# Patient Record
Sex: MALE | Race: BLACK OR AFRICAN AMERICAN | HISPANIC OR LATINO | Employment: STUDENT | URBAN - NONMETROPOLITAN AREA
[De-identification: names, ages, dates, MRNs, and addresses within clinical notes are randomized per-mention and may not be internally consistent; named-entity substitution may affect disease eponyms.]

---
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
## 2024-01-13 ENCOUNTER — HOSPITAL ENCOUNTER (EMERGENCY)
Facility: HOSPITAL | Age: 19
Discharge: HOME | End: 2024-01-13
Attending: EMERGENCY MEDICINE | Admitting: EMERGENCY MEDICINE
Payer: COMMERCIAL

## 2024-01-13 VITALS
HEART RATE: 84 BPM | SYSTOLIC BLOOD PRESSURE: 122 MMHG | TEMPERATURE: 97.9 F | OXYGEN SATURATION: 97 % | RESPIRATION RATE: 16 BRPM | DIASTOLIC BLOOD PRESSURE: 78 MMHG

## 2024-01-13 DIAGNOSIS — R36.9 ABNORMAL PENILE DISCHARGE: ICD-10-CM

## 2024-01-13 DIAGNOSIS — Z11.3 SCREENING EXAMINATION FOR STD (SEXUALLY TRANSMITTED DISEASE): Primary | ICD-10-CM

## 2024-01-13 LAB
BACTERIA UR QL AUTO: NORMAL /HPF
BILIRUB UR QL STRIP: NEGATIVE
CLARITY UR: ABNORMAL
COLOR UR: YELLOW
GLUCOSE UR STRIP-MCNC: NEGATIVE MG/DL
HGB UR QL STRIP.AUTO: NEGATIVE
KETONES UR STRIP-MCNC: NEGATIVE MG/DL
LEUKOCYTE ESTERASE UR QL STRIP: ABNORMAL
NITRITE UR QL STRIP: NEGATIVE
NON-SQ EPI CELLS URNS QL MICRO: NORMAL /HPF
PH UR STRIP.AUTO: 7.5 [PH]
PROT UR STRIP-MCNC: NEGATIVE MG/DL
RBC #/AREA URNS AUTO: NORMAL /HPF
SP GR UR STRIP.AUTO: 1.01 (ref 1–1.03)
UROBILINOGEN UR QL STRIP.AUTO: 0.2 E.U./DL
WBC #/AREA URNS AUTO: NORMAL /HPF

## 2024-01-13 PROCEDURE — 96372 THER/PROPH/DIAG INJ SC/IM: CPT

## 2024-01-13 PROCEDURE — 81001 URINALYSIS AUTO W/SCOPE: CPT | Performed by: EMERGENCY MEDICINE

## 2024-01-13 PROCEDURE — 36415 COLL VENOUS BLD VENIPUNCTURE: CPT | Performed by: EMERGENCY MEDICINE

## 2024-01-13 PROCEDURE — 86706 HEP B SURFACE ANTIBODY: CPT | Performed by: EMERGENCY MEDICINE

## 2024-01-13 PROCEDURE — 87661 TRICHOMONAS VAGINALIS AMPLIF: CPT | Performed by: EMERGENCY MEDICINE

## 2024-01-13 PROCEDURE — 86780 TREPONEMA PALLIDUM: CPT | Performed by: EMERGENCY MEDICINE

## 2024-01-13 PROCEDURE — 87340 HEPATITIS B SURFACE AG IA: CPT | Performed by: EMERGENCY MEDICINE

## 2024-01-13 PROCEDURE — 99283 EMERGENCY DEPT VISIT LOW MDM: CPT

## 2024-01-13 PROCEDURE — 87389 HIV-1 AG W/HIV-1&-2 AB AG IA: CPT | Performed by: EMERGENCY MEDICINE

## 2024-01-13 PROCEDURE — 87591 N.GONORRHOEAE DNA AMP PROB: CPT | Performed by: EMERGENCY MEDICINE

## 2024-01-13 PROCEDURE — 99284 EMERGENCY DEPT VISIT MOD MDM: CPT | Performed by: EMERGENCY MEDICINE

## 2024-01-13 PROCEDURE — 87491 CHLMYD TRACH DNA AMP PROBE: CPT | Performed by: EMERGENCY MEDICINE

## 2024-01-13 PROCEDURE — 87563 M. GENITALIUM AMP PROBE: CPT | Performed by: EMERGENCY MEDICINE

## 2024-01-13 PROCEDURE — 86803 HEPATITIS C AB TEST: CPT | Performed by: EMERGENCY MEDICINE

## 2024-01-13 RX ORDER — ONDANSETRON 4 MG/1
4 TABLET, ORALLY DISINTEGRATING ORAL ONCE
Status: COMPLETED | OUTPATIENT
Start: 2024-01-13 | End: 2024-01-13

## 2024-01-13 RX ORDER — DOXYCYCLINE HYCLATE 100 MG/1
100 CAPSULE ORAL 2 TIMES DAILY
Qty: 14 CAPSULE | Refills: 0 | Status: SHIPPED | OUTPATIENT
Start: 2024-01-13 | End: 2024-01-20

## 2024-01-13 RX ORDER — ONDANSETRON 4 MG/1
4 TABLET, ORALLY DISINTEGRATING ORAL EVERY 6 HOURS PRN
Qty: 20 TABLET | Refills: 0 | Status: SHIPPED | OUTPATIENT
Start: 2024-01-13

## 2024-01-13 RX ORDER — DOXYCYCLINE HYCLATE 100 MG/1
100 CAPSULE ORAL ONCE
Status: COMPLETED | OUTPATIENT
Start: 2024-01-13 | End: 2024-01-13

## 2024-01-13 RX ADMIN — DOXYCYCLINE HYCLATE 100 MG: 100 CAPSULE ORAL at 14:06

## 2024-01-13 RX ADMIN — ONDANSETRON 4 MG: 4 TABLET, ORALLY DISINTEGRATING ORAL at 14:06

## 2024-01-13 RX ADMIN — LIDOCAINE HYDROCHLORIDE 500 MG: 10 INJECTION, SOLUTION EPIDURAL; INFILTRATION; INTRACAUDAL; PERINEURAL at 14:19

## 2024-01-13 NOTE — DISCHARGE INSTRUCTIONS
You will receive a phone call with your results, please avoid sexual activity during treatment and at least 1 week after completion.

## 2024-01-13 NOTE — ED PROVIDER NOTES
History  Chief Complaint   Patient presents with    Exposure to STD     Pt  stated he had a  unprotected sex    with female  in December.c/o  greenish discharge    from  penis    last few days. Denies any other c/o     19yo male presents for evaluation of STI. Patient reports having unprotected sex in December with a new partner. For the last week or so he has experienced green penile discharge. He denies rashes or wounds, painful urination, increased frequency. He further denies abdominal pain, fevers, body aches. He is uncertain if this partner has been diagnosed with anything.         None       History reviewed. No pertinent past medical history.    History reviewed. No pertinent surgical history.    Family History   Problem Relation Age of Onset    Hyperlipidemia Maternal Grandmother     Asthma Maternal Grandmother     Hypertension Maternal Grandmother      I have reviewed and agree with the history as documented.    E-Cigarette/Vaping    E-Cigarette Use Never User      E-Cigarette/Vaping Substances    Nicotine No     THC No     CBD No     Flavoring No      Social History     Tobacco Use    Smoking status: Never    Smokeless tobacco: Never   Vaping Use    Vaping status: Never Used   Substance Use Topics    Alcohol use: Never    Drug use: Never       Review of Systems   Constitutional:  Negative for activity change, appetite change and fever.   Gastrointestinal:  Negative for abdominal pain, nausea and vomiting.   Genitourinary:  Positive for penile discharge. Negative for dysuria, frequency, genital sores, penile pain, scrotal swelling and testicular pain.   Musculoskeletal:  Negative for myalgias.   All other systems reviewed and are negative.      Physical Exam  Physical Exam  Vitals reviewed.   Constitutional:       General: He is not in acute distress.     Appearance: Normal appearance. He is not ill-appearing, toxic-appearing or diaphoretic.   HENT:      Head: Normocephalic and atraumatic.      Right Ear:  External ear normal.      Left Ear: External ear normal.      Nose: Nose normal.   Eyes:      General:         Right eye: No discharge.         Left eye: No discharge.   Cardiovascular:      Rate and Rhythm: Normal rate.   Pulmonary:      Effort: Pulmonary effort is normal. No respiratory distress.   Musculoskeletal:         General: No deformity or signs of injury.   Skin:     General: Skin is warm.      Coloration: Skin is not jaundiced or pale.   Neurological:      General: No focal deficit present.      Mental Status: He is alert.      Gait: Gait normal.         Vital Signs  ED Triage Vitals [01/13/24 1321]   Temperature Pulse Respirations Blood Pressure SpO2   97.9 °F (36.6 °C) 84 20 112/68 98 %      Temp Source Heart Rate Source Patient Position - Orthostatic VS BP Location FiO2 (%)   Tympanic Monitor Sitting Right arm --      Pain Score       --           Vitals:    01/13/24 1321 01/13/24 1425   BP: 112/68 122/78   Pulse: 84    Patient Position - Orthostatic VS: Sitting Sitting         Visual Acuity      ED Medications  Medications   cefTRIAXone (ROCEPHIN) 500 mg in lidocaine (PF) (XYLOCAINE-MPF) 1 % IM only syringe (500 mg Intramuscular Given 1/13/24 1419)   doxycycline hyclate (VIBRAMYCIN) capsule 100 mg (100 mg Oral Given 1/13/24 1406)   ondansetron (ZOFRAN-ODT) dispersible tablet 4 mg (4 mg Oral Given 1/13/24 1406)       Diagnostic Studies  Results Reviewed       Procedure Component Value Units Date/Time    Urine Microscopic [[id number]]  (Normal) Collected: 01/13/24 1346    Lab Status: Final result Specimen: Urine, Clean Catch Updated: 01/13/24 1422     RBC, UA None Seen /hpf      WBC, UA 0-1 /hpf      Epithelial Cells None Seen /hpf      Bacteria, UA None Seen /hpf     UA w Reflex to Microscopic w Reflex to Culture [[id number]]  (Abnormal) Collected: 01/13/24 1346    Lab Status: Final result Specimen: Urine, Clean Catch Updated: 01/13/24 1416     Color, UA Yellow     Clarity, UA Slightly Cloudy      "Specific Gravity, UA 1.015     pH, UA 7.5     Leukocytes, UA Small     Nitrite, UA Negative     Protein, UA Negative mg/dl      Glucose, UA Negative mg/dl      Ketones, UA Negative mg/dl      Urobilinogen, UA 0.2 E.U./dl      Bilirubin, UA Negative     Occult Blood, UA Negative    Chlamydia/GC amplified DNA by PCR [[id number]] Collected: 01/13/24 1347    Lab Status: In process Specimen: Urine, Other Updated: 01/13/24 1411    Trichomonas vaginalis/Mycoplasma genitalium PCR [[id number]] Collected: 01/13/24 1347    Lab Status: In process Specimen: Urine Updated: 01/13/24 1411    HIV 1/2 AB/AG w Reflex [facility] for 2 yr old and above [[id number]] Collected: 01/13/24 1359    Lab Status: In process Specimen: Blood from Arm, Right Updated: 01/13/24 1410    Hepatitis B surface antigen [[id number]] Collected: 01/13/24 1359    Lab Status: In process Specimen: Blood from Arm, Right Updated: 01/13/24 1410    Hepatitis C antibody [[id number]] Collected: 01/13/24 1359    Lab Status: In process Specimen: Blood from Arm, Right Updated: 01/13/24 1410    Hepatitis B surface antibody [[id number]] Collected: 01/13/24 1359    Lab Status: In process Specimen: Blood from Arm, Right Updated: 01/13/24 1410    RPR-Syphilis Screening (Total Syphilis IGG/IGM) [[id number]] Collected: 01/13/24 1359    Lab Status: In process Specimen: Blood from Hand, Left Updated: 01/13/24 1410    HSV TYPE 1,2 DNA PCR [facility] SWAB ONLY [[id number]] Updated: 01/13/24 1410    Lab Status: No result Specimen: Swab from Other                    No orders to display              Procedures  Procedures         ED Course         CRAFFT      Flowsheet Row Most Recent Value   CRAFFT Initial Screen: During the past 12 months, did you:    1. Drink any alcohol (more than a few sips)?  No Filed at: 01/13/2024 1329   2. Smoke any marijuana or hashish No Filed at: 01/13/2024 1329   3. Use anything else to get high? (\"anything else\" includes illegal drugs, over the counter and prescription " drugs, and things that you sniff or 'farooq')? No Filed at: 01/13/2024 1329                                            Medical Decision Making  19yo male presents for STI testing, we discussed also testing for other pathogens such as HIV, syphilis, and he would like to obtain these test as well. Given he is symptomatic with discharge, will empirically treat with rocephin and doxycyline for STI.     Amount and/or Complexity of Data Reviewed  Labs: ordered.    Risk  Prescription drug management.             Disposition  Final diagnoses:   Screening examination for STD (sexually transmitted disease)   Abnormal penile discharge     Time reflects when diagnosis was documented in both MDM as applicable and the Disposition within this note       Time User Action Codes Description Comment    1/13/2024  1:47 PM Niya Wilkins Add [Z11.3] Screening examination for STD (sexually transmitted disease)     1/13/2024  1:52 PM Niya Wilkins Add [R36.9] Abnormal penile discharge           ED Disposition       ED Disposition   Discharge    Condition   Stable    Date/Time   Sat Jan 13, 2024 1352    Comment   Leonardo Daniel discharge to home/self care.                   Follow-up Information       Follow up With Specialties Details Why Contact Info Additional Information    Faye Gordon MD Pediatrics   [address] [facility] [address] [phone]       [facility] Schedule an appointment as soon as possible for a visit   [address] [place] [address] [phone] [facility], [address] [place] [address], [address] [phone]            Discharge Medication List as of 1/13/2024  1:53 PM        START taking these medications    Details   doxycycline hyclate (VIBRAMYCIN) 100 mg capsule Take 1 capsule (100 mg total) by mouth 2 (two) times a day for 7 days, Starting Sat 1/13/2024, Until Sat 1/20/2024,  Normal      ondansetron (ZOFRAN-ODT) 4 mg disintegrating tablet Take 1 tablet (4 mg total) by mouth every 6 (six) hours as needed for nausea, Starting Sat 1/13/2024, Normal             No discharge procedures on file.    PDMP Review       None            ED Provider  Electronically Signed by             Niya Wilkins DO  01/13/24 2859

## 2024-01-14 LAB
C TRACH DNA SPEC QL NAA+PROBE: POSITIVE
HBV SURFACE AB SER-ACNC: 29 MIU/ML
HBV SURFACE AG SER QL: NORMAL
HCV AB SER QL: NORMAL
HIV 1+2 AB+HIV1 P24 AG SERPL QL IA: NORMAL
HIV 2 AB SERPL QL IA: NORMAL
HIV1 AB SERPL QL IA: NORMAL
HIV1 P24 AG SERPL QL IA: NORMAL
M GENITALIUM DNA SPEC QL NAA+PROBE: NEGATIVE
N GONORRHOEA DNA SPEC QL NAA+PROBE: NEGATIVE
T VAGINALIS DNA SPEC QL NAA+PROBE: NEGATIVE
TREPONEMA PALLIDUM IGG+IGM AB [PRESENCE] IN SERUM OR PLASMA BY IMMUNOASSAY: NORMAL

## 2024-01-15 DIAGNOSIS — Z11.3 ROUTINE SCREENING FOR STI (SEXUALLY TRANSMITTED INFECTION): Primary | ICD-10-CM

## 2024-01-15 PROBLEM — A74.9 CHLAMYDIA INFECTION: Status: ACTIVE | Noted: 2024-01-15

## 2024-01-15 PROBLEM — A74.9 CHLAMYDIA INFECTION: Status: ACTIVE | Noted: 2024-01-13

## 2024-12-17 ENCOUNTER — OFFICE VISIT (OUTPATIENT)
Dept: URGENT CARE | Facility: MEDICAL CENTER | Age: 19
End: 2024-12-17
Payer: COMMERCIAL

## 2024-12-17 VITALS
RESPIRATION RATE: 18 BRPM | SYSTOLIC BLOOD PRESSURE: 119 MMHG | BODY MASS INDEX: 30.38 KG/M2 | DIASTOLIC BLOOD PRESSURE: 72 MMHG | TEMPERATURE: 98.7 F | OXYGEN SATURATION: 98 % | WEIGHT: 217 LBS | HEIGHT: 71 IN | HEART RATE: 86 BPM

## 2024-12-17 DIAGNOSIS — N50.89 GENITAL LESION, MALE: Primary | ICD-10-CM

## 2024-12-17 PROCEDURE — 87591 N.GONORRHOEAE DNA AMP PROB: CPT | Performed by: STUDENT IN AN ORGANIZED HEALTH CARE EDUCATION/TRAINING PROGRAM

## 2024-12-17 PROCEDURE — G0382 LEV 3 HOSP TYPE B ED VISIT: HCPCS | Performed by: STUDENT IN AN ORGANIZED HEALTH CARE EDUCATION/TRAINING PROGRAM

## 2024-12-17 PROCEDURE — 87491 CHLMYD TRACH DNA AMP PROBE: CPT | Performed by: STUDENT IN AN ORGANIZED HEALTH CARE EDUCATION/TRAINING PROGRAM

## 2024-12-17 RX ORDER — CLINDAMYCIN PHOSPHATE 11.9 MG/ML
SOLUTION TOPICAL 2 TIMES DAILY
Qty: 30 ML | Refills: 0 | Status: SHIPPED | OUTPATIENT
Start: 2024-12-17

## 2024-12-17 NOTE — PROGRESS NOTES
[facility]        NAME: Leonardo Daniel is a 19 y.o. male  : 2005    MRN: [id number]  DATE: 2024  TIME: 11:40 AM    Assessment and Orders   Genital lesion, male [N50.89]  1. Genital lesion, male  Chlamydia/GC amplified DNA by PCR    Herpes Simplex Virus (HSV) Types 1 and 2, LUANN    clindamycin (CLEOCIN T) 1 % external solution            Plan and Discussion      Lesion on shaft of the penis and on mons pubis appears to be pustular in nature, more consistent with folliculitis rather than HSV.  Nonpainful and not draining.  Not ulcerated.  Swab sent for HSV testing.  Will treat with topical clindamycin gel.    Although lesion is NOT ulcerated on the shaft of the penis, there is concern for syphilis. Should be tested. Should be seen by PCP or STI clinic for testing asap.      Patient requesting to be retested for chlamydia.  Urine sample sent for testing.    Risks and benefits discussed. Patient understands and agrees with the plan.     PATIENT INSTRUCTIONS      If tests have been performed at [facility], our office will contact you with results if changes need to be made to the care plan discussed with you at the visit.  [facility]    Follow up with PCP.     If any of the following occur, please report to your nearest ED for evaluation or call 911.   Difficultly breathing or shortness of breath  Chest pain  Acutely worsening symptoms.         Chief Complaint     Chief Complaint   Patient presents with    Exposure to STD     Pt stated he was sexually active 2wks ago and since then has  noticed bumps in pubic area and a pimple on penis, pt reports no other symptoms, no medication taken for symptoms          History of Present Illness       Same partner.  Unprotected sex 2 weeks ago.    Lesions started 1-2 months ago. Lesions are painless. They seem to come and go. No drainage or bleeding.   No difficultly urinating.      Concerned that he still  "has chlamydia from last year.  States that he was sexually active with infected partner before finishing antibiotics.  Has not had any penile discharge but would like to be retested.    Exposure to STD        Review of Systems   Review of Systems  As stated above    Current Medications       Current Outpatient Medications:     clindamycin (CLEOCIN T) 1 % external solution, Apply topically 2 (two) times a day, Disp: 30 mL, Rfl: 0    ondansetron (ZOFRAN-ODT) 4 mg disintegrating tablet, Take 1 tablet (4 mg total) by mouth every 6 (six) hours as needed for nausea (Patient not taking: Reported on 12/17/2024), Disp: 20 tablet, Rfl: 0    Current Allergies     Allergies as of 12/17/2024    (No Known Allergies)            The following portions of the patient's history were reviewed and updated as appropriate: allergies, current medications, past family history, past medical history, past social history, past surgical history and problem list.     History reviewed. No pertinent past medical history.    History reviewed. No pertinent surgical history.    Family History   Problem Relation Age of Onset    Hyperlipidemia Maternal Grandmother     Asthma Maternal Grandmother     Hypertension Maternal Grandmother          Medications have been verified.        Objective   /72   Pulse 86   Temp 98.7 °F (37.1 °C)   Resp 18   Ht 5' 11\" (1.803 m)   Wt 98.4 kg (217 lb)   SpO2 98%   BMI 30.27 kg/m²   No LMP for male patient.       Physical Exam     Physical Exam  Exam conducted with a chaperone present.   Constitutional:       General: He is not in acute distress.     Appearance: He is not ill-appearing.   Cardiovascular:      Rate and Rhythm: Normal rate.   Pulmonary:      Effort: Pulmonary effort is normal. No respiratory distress.   Genitourinary:     Penis: Uncircumcised. Lesions present. No discharge.           Comments: Small pustular lesions.  Nondraining.  Not bleeding.  Nontender.  Neurological:      General: No " focal deficit present.      Mental Status: He is alert and oriented to person, place, and time.   Psychiatric:         Mood and Affect: Mood normal.         Behavior: Behavior normal.               Brunilda Edwards DO

## 2024-12-18 ENCOUNTER — TELEPHONE (OUTPATIENT)
Dept: LAB | Facility: HOSPITAL | Age: 19
End: 2024-12-18

## 2024-12-18 ENCOUNTER — TELEPHONE (OUTPATIENT)
Dept: URGENT CARE | Facility: CLINIC | Age: 19
End: 2024-12-18

## 2024-12-18 DIAGNOSIS — A74.9 CHLAMYDIA: Primary | ICD-10-CM

## 2024-12-18 LAB
C TRACH DNA SPEC QL NAA+PROBE: POSITIVE
N GONORRHOEA DNA SPEC QL NAA+PROBE: NEGATIVE

## 2024-12-18 RX ORDER — DOXYCYCLINE 100 MG/1
100 CAPSULE ORAL EVERY 12 HOURS SCHEDULED
Qty: 14 CAPSULE | Refills: 0 | Status: SHIPPED | OUTPATIENT
Start: 2024-12-18 | End: 2024-12-25

## 2024-12-18 NOTE — PROGRESS NOTES
Left message asking for call back. Chlamydia positive. Doxycycline sent for treatment. Avoid sexual activity for 7 days during treatment and 7 days after.

## 2025-04-08 ENCOUNTER — TELEPHONE (OUTPATIENT)
Age: 20
End: 2025-04-08

## 2025-04-10 ENCOUNTER — TELEPHONE (OUTPATIENT)
Dept: FAMILY MEDICINE CLINIC | Facility: CLINIC | Age: 20
End: 2025-04-10